# Patient Record
Sex: MALE | Race: WHITE | NOT HISPANIC OR LATINO | Employment: OTHER | URBAN - METROPOLITAN AREA
[De-identification: names, ages, dates, MRNs, and addresses within clinical notes are randomized per-mention and may not be internally consistent; named-entity substitution may affect disease eponyms.]

---
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
## 2022-01-24 ENCOUNTER — OFFICE VISIT (OUTPATIENT)
Dept: FAMILY MEDICINE CLINIC | Facility: CLINIC | Age: 22
End: 2022-01-24

## 2022-01-24 VITALS
TEMPERATURE: 98 F | BODY MASS INDEX: 25.11 KG/M2 | HEIGHT: 67 IN | DIASTOLIC BLOOD PRESSURE: 72 MMHG | HEART RATE: 100 BPM | WEIGHT: 160 LBS | SYSTOLIC BLOOD PRESSURE: 126 MMHG | OXYGEN SATURATION: 95 %

## 2022-01-24 DIAGNOSIS — Z00.00 HEALTHCARE MAINTENANCE: Primary | ICD-10-CM

## 2022-01-24 DIAGNOSIS — Z02.5 ROUTINE SPORTS PHYSICAL EXAM: ICD-10-CM

## 2022-01-24 PROCEDURE — 99395 PREV VISIT EST AGE 18-39: CPT | Performed by: NURSE PRACTITIONER

## 2022-01-24 PROCEDURE — 3008F BODY MASS INDEX DOCD: CPT | Performed by: NURSE PRACTITIONER

## 2022-01-24 PROCEDURE — 2014F MENTAL STATUS ASSESS: CPT | Performed by: NURSE PRACTITIONER

## 2022-01-24 RX ORDER — BACLOFEN 20 MG/1
20 TABLET ORAL 3 TIMES DAILY
COMMUNITY
Start: 2021-12-08

## 2022-01-24 NOTE — PROGRESS NOTES
Subjective   Justin Johnston is a 21 y.o. male who presents for a school sports physical exam. Patient/parent deny any current health related concerns.  He plans to participate in basketball and bowling.     Nutrition:He is Eating well-balanced meals and healthy snacks with no concerns. He Does not drink milk, but eats dairy foods well.    Home: No social issues were raised regarding home    School and Social Development: He attends [employer] in 12 grade and the patient Is doing well in school and Is currently working part-time while attending school; he goes to [employer] and works through school program.     He  does watch television. He does play video games. He does use a computer/cell phone/tablet at home.     Substance Use: The patient denies use of alcohol, tobacco, or illicit drugs.    Puberty/Sexuality: not applicable. is not sexually active.    Mental Health: Pt denies feeling down, currently going to therapy     Safety: The patient is restrained with a seatbelt while traveling in a motor vehicle at all times.  He does not ride a bicycle or four-shanks.     He does have a family history of hypertension, hyperlipidemia, or myocardial infarction before the age of 50.     Hedoes not have dental issues. He has established dental care.      Review of Systems  A comprehensive review of systems was negative except for: Review of Systems   Constitutional: Negative for chills and fever.   Respiratory: Negative for cough and shortness of breath.    Cardiovascular: Negative for chest pain and palpitations.   Gastrointestinal: Negative for abdominal pain, diarrhea, nausea and vomiting.   Psychiatric/Behavioral: The patient is not nervous/anxious.           Result Review :     The following portions of the patient's history were reviewed and updated as appropriate: allergies, current medications, past family history, past medical history, past social history, past surgical history, and  problem list.    Immunization History   Administered Date(s) Administered   • COVID-19 (PFIZER) PURPLE CAP 11/22/2021, 12/13/2021   • DTaP 2002, 2002, 02/25/2003, 12/13/2003, 08/14/2006   • Flu Vaccine Intradermal Quad 18-64YR 09/10/2018   • FluLaval/Fluarix/Fluzone >6 10/13/2021   • Hep A, 2 Dose 08/06/2018, 04/12/2019   • Hep B, Adolescent or Pediatric 2002, 2002, 02/25/2003   • Hepatitis A 04/12/2019   • Hepatitis B 02/25/2003   • HiB 08/28/2003   • Hib (PRP-T) 2002, 2002, 02/25/2003, 08/28/2003   • IPV 2002, 2002, 08/28/2003, 08/14/2006   • MMR 08/28/2003, 08/14/2006   • Meningococcal Conjugate 09/10/2018   • Meningococcal MCV4P (Menactra) 12/03/2013, 09/10/2018   • Pneumococcal Conjugate 13-Valent (PCV13) 2002, 02/25/2003, 04/21/2003, 12/13/2003   • Tdap 12/02/2013   • Varicella 08/28/2003, 08/02/2018                Objective      Physical Exam  Vitals reviewed.   Constitutional:       General: He is not in acute distress.     Appearance: Normal appearance. He is well-developed.   HENT:      Head: Normocephalic and atraumatic.      Right Ear: Tympanic membrane, ear canal and external ear normal.      Left Ear: Tympanic membrane, ear canal and external ear normal.      Nose: Nose normal.      Mouth/Throat:      Mouth: Mucous membranes are moist.   Eyes:      Conjunctiva/sclera: Conjunctivae normal.      Pupils: Pupils are equal, round, and reactive to light.   Cardiovascular:      Rate and Rhythm: Normal rate and regular rhythm.      Heart sounds: Normal heart sounds.   Pulmonary:      Effort: Pulmonary effort is normal.      Breath sounds: Normal breath sounds.   Abdominal:      General: Bowel sounds are normal.      Palpations: Abdomen is soft.   Musculoskeletal:      Cervical back: Neck supple.      Right lower leg: No edema.      Left lower leg: No edema.      Comments: Spasticity of right hand and wrist.  Some spasticity of bilateral ankles.  Patient  "with splint/brace on right wrist.  Lower extremity paresis.   Skin:     General: Skin is warm and dry.   Neurological:      Mental Status: He is alert and oriented to person, place, and time. Mental status is at baseline.      Motor: Weakness present.      Gait: Gait abnormal.   Psychiatric:         Mood and Affect: Mood and affect normal.         Behavior: Behavior normal.         Thought Content: Thought content normal.         Judgment: Judgment normal.         Vitals:    01/24/22 0837   BP: 126/72   BP Location: Left arm   Patient Position: Sitting   Cuff Size: Adult   Pulse: 100   Temp: 98 °F (36.7 °C)   TempSrc: Temporal   SpO2: 95%   Weight: 72.6 kg (160 lb)   Height: 170.2 cm (67\")        Assessment/Plan     Diagnoses and all orders for this visit:    1. Healthcare maintenance (Primary)    2. Routine sports physical exam        Satisfactory school sports physical exam.     Permission granted to participate in athletics without restrictions. Form signed and returned to patient.  Anticipatory guidance: Gave handout on well-child issues at this age.         "

## 2022-01-24 NOTE — PATIENT INSTRUCTIONS
"Well Child Nutrition, Young Adult  This sheet provides general nutrition recommendations. Talk with a health care provider or a diet and nutrition specialist (dietitian) if you have any questions.  Nutrition    The amount of food you need to eat every day depends on your age, sex, size, and activity level. To figure out your daily calorie needs, look for a calorie calculator online or talk with your health care provider.  Balanced diet  Eat a balanced diet. Try to include:  · Fruits. Aim for 2 cups a day. Examples of 1 cup of fruit include 1 large banana, 1 small apple, 8 large strawberries, or 1 large orange. Eat a variety of whole fruits and 100% fruit juice. Choose fresh, canned, frozen, or dried forms. Choose canned fruit that has the lowest added sugar or no added sugar.  · Vegetables. Aim for 2½-3 cups a day. Examples of 1 cup of vegetables include 2 medium carrots, 1 large tomato, or 2 stalks of celery. Choose fresh, frozen, canned, and dried options. Eat vegetables of a variety of colors.  · Low-fat dairy. Aim for 3 cups a day. Examples of 1 cup of dairy include 8 oz (230 mL) of milk, 8 oz (230 g) of yogurt, or 1½ oz (44 g) of natural cheese. Choose fat-free or low-fat dairy products, including milk, yogurt, and cheese. If you are unable to tolerate dairy (lactose intolerant) or you choose not to consume dairy, you may include fortified soy beverages (soy milk).  · Whole grains. Of the grain foods that you eat each day (such as pasta, rice, and tortillas), aim to include 6-8 \"ounce-equivalents\" of whole-grain options. Examples of 1 ounce-equivalent of whole grains include 1 cup of whole-wheat cereal, ½ cup of brown rice, or 1 slice of whole-wheat bread. Try to choose whole grains including brown rice, wild rice, quinoa, and oats.  · Lean proteins. Aim for 5½-6½ \"ounce-equivalents\" a day. Eat a variety of protein foods, including lean meats, seafood, poultry, eggs, legumes (beans and peas), nuts, seeds, and " soy products.  ? A cut of meat or fish that is the size of a deck of cards is about 3-4 ounce-equivalents.  ? Foods that provide 1 ounce-equivalent of protein include 1 egg, ½ cup of nuts or seeds, or 1 tablespoon (16 g) of peanut butter.  For more information and options for foods in a balanced diet, visit www.choosemyplate.gov  Tips for healthy snacking  · A snack should not be the size of a full meal. Eat snacks that have 200 calories or less. Examples include:  ? ½ whole-wheat marbin with ¼ cup hummus.  ? 2 or 3 slices of deli turkey wrapped around a cheese stick.  ? ½ apple with 1 tablespoon of peanut butter.  ? 10 baked chips with salsa.  · Keep cut-up fruits and vegetables available at home and at school so they are easy to eat.  · Pack healthy snacks the night before or when you pack your lunch.  · Avoid pre-packaged foods. These tend to be higher in fat, sugar, and salt (sodium).  · Get involved with shopping, or ask the primary food  in your household to get healthy snacks that you like.  · Avoid chips, candy, cake, and soft drinks.  Foods to avoid  · Fried or heavily processed foods, such as toaster pastries and microwaveable dinners.  · Drinks that contain a lot of sugar, such as sports drinks, sodas, and juice.  · Foods that contain a lot of fat, sodium, or sugar.  Food safety  Prepare your food safely:  · Wash your hands after handling raw meats.  · Keep food preparation surfaces clean by washing them regularly with hot, soapy water.  · Keep raw meats separate from foods that are ready-to-eat, such as fruits and vegetables.  · , meat, poultry, and eggs to the recommended minimum safe internal temperature.  · Store foods at safe temperatures. In general:  ? Keep cold foods at 40°F (4°C) or colder.  ? Keep your freezer at 0°F (-18°C or 18 degrees below 0°C) or colder.  ? Keep hot foods at 140°F (60°C) or warmer.  ? Foods are no longer safe to eat when they have been at a temperature of  °F (4-60°C) for more than 2 hours.  Physical activity  · Try to get 150 minutes of moderate-intensity physical activity each week. Examples include walking briskly or bicycling slower than 10 miles an hour (16 km an hour).  · Do muscle-strengthening exercises on 2 or more days a week.  · If you find it difficult to fit regular physical activity into your schedule, try:  ? Taking the stairs instead of the elevator.  ? Parking your car farther from the entrance or at the back of the parking lot.  ? Biking or walking to work or school.  · If you need to lose weight, you may need to reduce your daily calorie intake and increase your daily amount of physical activity. Check with your health care provider before you start a new diet and exercise plan.  General instructions  · Do not skip meals, especially breakfast.  · Water is the ideal beverage. Aim to drink six 8-oz glasses of water each day.  · Avoid fad diets. These may affect your mood and growth.  · If you choose to consume alcohol:  ? Drink in moderation. This means two drinks a day for men and one drink a day for nonpregnant women. One drink equals 12 oz of beer, 5 oz of wine, or 1½ oz of hard liquor.  · You may drink coffee. It is recommended that you limit coffee intake to three to five 8-oz cups a day (up to 400 mg of caffeine).  · If you are worried about your body image, talk with your parents, your health care provider, or another trusted adult like a  or counselor. You may be at risk for developing an eating disorder. Eating disorders can lead to serious medical problems.  · Food allergies may cause you to have a reaction (such as a rash, diarrhea, or vomiting) after eating or drinking. Talk with your health care provider if you have concerns about food allergies.  Summary  · Eat a balanced diet. Include fruits, vegetables, low-fat dairy, whole grains, and lean proteins.  · Try to get 150 minutes of moderate-intensity physical activity each  week, and do muscle-strengthening exercises on 2 or more days a week.  · Choose healthy snacks that are 200 calories or less.  · Drink plenty of water. Try to drink six 8-oz glasses a day.  This information is not intended to replace advice given to you by your health care provider. Make sure you discuss any questions you have with your health care provider.  Document Revised: 04/07/2020 Document Reviewed: 08/01/2018  Elsevier Patient Education © 2021 Elsevier Inc.

## 2022-02-21 ENCOUNTER — OFFICE VISIT (OUTPATIENT)
Dept: FAMILY MEDICINE CLINIC | Facility: CLINIC | Age: 22
End: 2022-02-21

## 2022-02-21 VITALS
WEIGHT: 160 LBS | TEMPERATURE: 98 F | BODY MASS INDEX: 25.11 KG/M2 | SYSTOLIC BLOOD PRESSURE: 120 MMHG | HEIGHT: 67 IN | OXYGEN SATURATION: 97 % | DIASTOLIC BLOOD PRESSURE: 82 MMHG | HEART RATE: 95 BPM

## 2022-02-21 DIAGNOSIS — R41.89 IMPAIRED COGNITION: ICD-10-CM

## 2022-02-21 DIAGNOSIS — Z02.9 ENCOUNTERS FOR ADMINISTRATIVE PURPOSE: ICD-10-CM

## 2022-02-21 DIAGNOSIS — Z87.820 HISTORY OF TRAUMATIC BRAIN INJURY: ICD-10-CM

## 2022-02-21 DIAGNOSIS — G40.211 LOCALIZATION-RELATED (FOCAL) (PARTIAL) SYMPTOMATIC EPILEPSY AND EPILEPTIC SYNDROMES WITH COMPLEX PARTIAL SEIZURES, INTRACTABLE, WITH STATUS EPILEPTICUS: Primary | ICD-10-CM

## 2022-02-21 PROCEDURE — 99213 OFFICE O/P EST LOW 20 MIN: CPT | Performed by: NURSE PRACTITIONER

## 2022-02-21 NOTE — PROGRESS NOTES
Chief Complaint  Seizures (annual Map 10 paperwork)    Subjective        Past Medical History:   Diagnosis Date   • Cerebral palsy (HCC)    • Constipation    • Epilepsy (HCC)    • Failure to thrive (child)    • Gastrostomy in place (HCC)    • Seizure (HCC)    • TBI (traumatic brain injury) (HCC)      Social History     Tobacco Use   • Smoking status: Never Smoker   • Smokeless tobacco: Never Used   Vaping Use   • Vaping Use: Never used   Substance Use Topics   • Alcohol use: Never   • Drug use: Never      Current Outpatient Medications on File Prior to Visit   Medication Sig   • baclofen (LIORESAL) 20 MG tablet Take 20 mg by mouth 3 (Three) Times a Day.   • cloBAZam (ONFI) 10 MG tablet    • glycopyrrolate (ROBINUL) 1 MG tablet Take 1 mg by mouth 2 (Two) Times a Day.   • levETIRAcetam (KEPPRA) 750 MG tablet Take 1,500 mg by mouth 2 (Two) Times a Day.   • OXcarbazepine (TRILEPTAL) 150 MG tablet Take 450 mg by mouth Every Morning.   • OXcarbazepine (TRILEPTAL) 600 MG tablet Take 600 mg by mouth Every Evening. Take with food   • sertraline (ZOLOFT) 25 MG tablet Take 1 tablet by mouth Daily.   • Vimpat 200 MG tablet      No current facility-administered medications on file prior to visit.      Allergies   Allergen Reactions   • Valproic Acid Other (See Comments)     Pancreatitis        Health Maintenance Due   Topic Date Due   • HPV VACCINES (1 - Male 2-dose series) Never done   • HEPATITIS C SCREENING  Never done      Justin Johnston presents to [facility]  Here with his mother to have [employer] Waiver completed. Pt has a hx of TBI at 4 years old and he has CP with epilepsy and impaired cognition. Pt is a Senior at [employer], [place] and is about to graduate but will go back for another year. Mother helps to transport him to appointments and assistance with medication management, cooking, money management, and household chores. Pt is unable to live without assistance. Mother also helps  "transport to his sporting events. He will stay with his grandparents on some weekends.     He is established with Neurology, Rehab medication, and Ortho. He has PT and OT. He had a G-tube but was removed about 2 months ago.       Objective   Vital Signs:   /82 (BP Location: Left arm)   Pulse 95   Temp 98 °F (36.7 °C)   Ht 170.2 cm (67\")   Wt 72.6 kg (160 lb)   SpO2 97%   BMI 25.06 kg/m²     Review of Systems   Cardiovascular: Negative for chest pain and palpitations.   Gastrointestinal: Negative for diarrhea, nausea and vomiting.   Neurological: Negative for dizziness, tremors, light-headedness and headache.      Physical Exam  Vitals reviewed.   Constitutional:       General: He is not in acute distress.     Appearance: Normal appearance. He is well-developed.   HENT:      Head: Normocephalic and atraumatic.      Right Ear: External ear normal.      Left Ear: External ear normal.   Eyes:      Conjunctiva/sclera: Conjunctivae normal.      Pupils: Pupils are equal, round, and reactive to light.   Cardiovascular:      Rate and Rhythm: Normal rate and regular rhythm.      Heart sounds: Normal heart sounds.   Pulmonary:      Effort: Pulmonary effort is normal.      Breath sounds: Normal breath sounds.   Musculoskeletal:      Cervical back: Neck supple.      Comments: Gait with a limp.  Contracture of the right hand and wrist.   Skin:     General: Skin is warm and dry.   Neurological:      Mental Status: He is alert and oriented to person, place, and time. Mental status is at baseline.   Psychiatric:         Mood and Affect: Mood and affect normal.         Behavior: Behavior normal.         Thought Content: Thought content normal.         Judgment: Judgment normal.        Result Review :                 Assessment and Plan    Diagnoses and all orders for this visit:    1. 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 History of traumatic brain injury    3. Impaired cognition    4. Encounters for administrative purpose        Follow Up   Return if symptoms worsen or fail to improve.  Patient was given instructions and counseling regarding his condition or for health maintenance advice. Please see specific information pulled into the AVS if appropriate.

## 2022-03-01 ENCOUNTER — TELEPHONE (OUTPATIENT)
Dept: FAMILY MEDICINE CLINIC | Facility: CLINIC | Age: 22
End: 2022-03-01

## 2022-03-01 NOTE — TELEPHONE ENCOUNTER
Caller: MEGAN MARCOS    Relationship to patient: Mother    Best call back number: CELL PHONE [phone] OR  LAND LINE [phone]    Chief complaint: PATIENT JUST NEEDS A COVID TEST PRIOR TO SURGERY ON 03/08/2022  FOR DIAGNOSIS GASTROCUTANEOUS FISTULA      Type of visit: COVID TEST ONLY     Requested date: TEST NEEDS DONE 96 HOURS PRIOR TO SURGERY DATE ON 03/08/2022 PAPERWORK STATES 4 DAYS PRIOR         Additional notes: RESULTS WILL NEED TO BE FAXED TO HIS SURGEON

## 2022-03-03 ENCOUNTER — TRANSCRIBE ORDERS (OUTPATIENT)
Dept: FAMILY MEDICINE CLINIC | Facility: CLINIC | Age: 22
End: 2022-03-03

## 2022-03-03 ENCOUNTER — CLINICAL SUPPORT (OUTPATIENT)
Dept: FAMILY MEDICINE CLINIC | Facility: CLINIC | Age: 22
End: 2022-03-03

## 2022-03-03 DIAGNOSIS — K31.6 GASTROCUTANEOUS FISTULA: Primary | ICD-10-CM

## 2022-03-03 LAB — SARS-COV-2 RNA PNL SPEC NAA+PROBE: NOT DETECTED

## 2022-03-03 PROCEDURE — U0004 COV-19 TEST NON-CDC HGH THRU: HCPCS | Performed by: SURGERY

## 2022-09-13 ENCOUNTER — OFFICE VISIT (OUTPATIENT)
Dept: FAMILY MEDICINE CLINIC | Facility: CLINIC | Age: 22
End: 2022-09-13

## 2022-09-13 VITALS
DIASTOLIC BLOOD PRESSURE: 72 MMHG | OXYGEN SATURATION: 98 % | TEMPERATURE: 98 F | HEART RATE: 103 BPM | HEIGHT: 67 IN | SYSTOLIC BLOOD PRESSURE: 110 MMHG | BODY MASS INDEX: 24.17 KG/M2 | WEIGHT: 154 LBS

## 2022-09-13 DIAGNOSIS — R63.4 WEIGHT LOSS: ICD-10-CM

## 2022-09-13 DIAGNOSIS — R31.9 HEMATURIA, UNSPECIFIED TYPE: ICD-10-CM

## 2022-09-13 DIAGNOSIS — B37.0 ORAL CANDIDIASIS: ICD-10-CM

## 2022-09-13 DIAGNOSIS — R42 DIZZINESS: Primary | ICD-10-CM

## 2022-09-13 LAB
BILIRUB BLD-MCNC: ABNORMAL MG/DL
CLARITY, POC: CLEAR
COLOR UR: YELLOW
GLUCOSE UR STRIP-MCNC: ABNORMAL MG/DL
KETONES UR QL: ABNORMAL
LEUKOCYTE EST, POC: NEGATIVE
NITRITE UR-MCNC: NEGATIVE MG/ML
PH UR: 7 [PH] (ref 5–8)
PROT UR STRIP-MCNC: ABNORMAL MG/DL
RBC # UR STRIP: ABNORMAL /UL
SP GR UR: 1.02 (ref 1–1.03)
UROBILINOGEN UR QL: ABNORMAL

## 2022-09-13 PROCEDURE — 99213 OFFICE O/P EST LOW 20 MIN: CPT | Performed by: NURSE PRACTITIONER

## 2022-09-13 PROCEDURE — 87086 URINE CULTURE/COLONY COUNT: CPT | Performed by: NURSE PRACTITIONER

## 2022-09-13 RX ORDER — POLYETHYLENE GLYCOL 3350 17 G/17G
17 POWDER, FOR SOLUTION ORAL
COMMUNITY

## 2022-09-13 NOTE — PROGRESS NOTES
Chief Complaint  Weight Loss (Since stopping the G tube) and Dizziness (Having severe dizziness from his medications)    Subjective        Past Medical History:   Diagnosis Date   • Cerebral palsy (HCC)    • Constipation    • Epilepsy (HCC)    • Failure to thrive (child)    • Gastrostomy in place (HCC)    • Seizure (HCC)    • TBI (traumatic brain injury) (HCC)      Social History     Tobacco Use   • Smoking status: Never Smoker   • Smokeless tobacco: Never Used   Vaping Use   • Vaping Use: Never used   Substance Use Topics   • Alcohol use: Never   • Drug use: Never      Current Outpatient Medications on File Prior to Visit   Medication Sig   • baclofen (LIORESAL) 20 MG tablet Take 20 mg by mouth 3 (Three) Times a Day.   • cloBAZam (ONFI) 10 MG tablet    • glycopyrrolate (ROBINUL) 1 MG tablet Take 1 mg by mouth 2 (Two) Times a Day.   • levETIRAcetam (KEPPRA) 750 MG tablet Take 1,500 mg by mouth 2 (Two) Times a Day.   • OXcarbazepine (TRILEPTAL) 150 MG tablet Take 450 mg by mouth Every Morning.   • OXcarbazepine (TRILEPTAL) 600 MG tablet Take 600 mg by mouth Every Evening. Take with food   • polyethylene glycol (MIRALAX) 17 GM/SCOOP powder Take 17 g by mouth.   • Vimpat 200 MG tablet    • sertraline (ZOLOFT) 25 MG tablet Take 1 tablet by mouth Daily.     No current facility-administered medications on file prior to visit.      Allergies   Allergen Reactions   • Valproic Acid Other (See Comments)     Pancreatitis        Health Maintenance Due   Topic Date Due   • HPV VACCINES (1 - Male 2-dose series) Never done   • HEPATITIS C SCREENING  Never done   • COVID-19 Vaccine (3 - Booster for Pfizer series) 05/13/2022      Justin Johnston presents to [facility]  Here with his mother for concern for mild weight loss. Pt had his G-tube taken out in June 2022. Pt states he doesn't eat very much at school because he doesn't like it. Pt will eat a pack of mini-muffins before school. Mother  "notes a decreased appetite. Pt is more active at school. He was previously eating up to 2 Big Mac's and a mcdaniels. Weight is down 6lbs from February.     Pt is seeing Neurology who is prescribing his medications and is transitioning to adult Neurology. Pt notes dizziness with some of his medications. Labs have been normal. Pt has not been having seizures so does not want to adjust medications. Pt notes his eyes will get glassy. Pt drinks little water.     Pt has a film on his tongue.  Patient's mother states that she brushes his teeth and tongue regularly and uses a tongue blade to scrape them as well without relief of symptoms.      Objective   Vital Signs:   /72 (BP Location: Left arm)   Pulse 103   Temp 98 °F (36.7 °C)   Ht 170.2 cm (67\")   Wt 69.9 kg (154 lb)   SpO2 98%   BMI 24.12 kg/m²     Review of Systems   Physical Exam  Vitals reviewed.   Constitutional:       General: He is not in acute distress.     Appearance: Normal appearance. He is well-developed.   HENT:      Mouth/Throat:      Comments: Thick white coating to tongue with a similar hairy appearance.  Eyes:      Conjunctiva/sclera: Conjunctivae normal.      Pupils: Pupils are equal, round, and reactive to light.   Cardiovascular:      Rate and Rhythm: Normal rate and regular rhythm.      Heart sounds: Normal heart sounds.   Pulmonary:      Effort: Pulmonary effort is normal.      Breath sounds: Normal breath sounds.   Musculoskeletal:      Cervical back: Neck supple.   Skin:     General: Skin is warm and dry.   Neurological:      Mental Status: He is alert and oriented to person, place, and time. Mental status is at baseline.   Psychiatric:         Mood and Affect: Mood and affect normal.         Behavior: Behavior normal.         Thought Content: Thought content normal.         Judgment: Judgment normal.        Result Review :                 Assessment and Plan    Diagnoses and all orders for this visit:    1. Dizziness (Primary)  -     " POCT urinalysis dipstick, manual    2. Weight loss    3. Oral candidiasis  -     nystatin (MYCOSTATIN) 100,000 unit/mL suspension; Swish and swallow 5 mL 4 (Four) Times a Day.  Dispense: 60 mL; Refill: 0      Increase food and water intake.   Encourage patient to increase in food intake and monitor school lunch menu and take lunch if knows there is no option that he likes.    Follow Up   Return if symptoms worsen or fail to improve.  Patient was given instructions and counseling regarding his condition or for health maintenance advice. Please see specific information pulled into the AVS if appropriate.

## 2022-09-14 LAB — BACTERIA SPEC AEROBE CULT: NO GROWTH

## 2023-02-26 DIAGNOSIS — F41.9 ANXIETY: ICD-10-CM

## 2023-02-27 RX ORDER — SERTRALINE HYDROCHLORIDE 25 MG/1
25 TABLET, FILM COATED ORAL DAILY
Qty: 90 TABLET | Refills: 1 | OUTPATIENT
Start: 2023-02-27

## 2023-03-02 ENCOUNTER — OFFICE VISIT (OUTPATIENT)
Dept: FAMILY MEDICINE CLINIC | Facility: CLINIC | Age: 23
End: 2023-03-02
Payer: MEDICAID

## 2023-03-02 VITALS
SYSTOLIC BLOOD PRESSURE: 120 MMHG | DIASTOLIC BLOOD PRESSURE: 82 MMHG | HEART RATE: 97 BPM | TEMPERATURE: 97.8 F | HEIGHT: 67 IN | WEIGHT: 140 LBS | BODY MASS INDEX: 21.97 KG/M2 | OXYGEN SATURATION: 96 %

## 2023-03-02 DIAGNOSIS — Z02.9 ENCOUNTERS FOR ADMINISTRATIVE PURPOSE: ICD-10-CM

## 2023-03-02 DIAGNOSIS — Z87.820 HISTORY OF TRAUMATIC BRAIN INJURY: ICD-10-CM

## 2023-03-02 DIAGNOSIS — G40.909 SEIZURE DISORDER: ICD-10-CM

## 2023-03-02 DIAGNOSIS — Z23 NEED FOR INFLUENZA VACCINATION: ICD-10-CM

## 2023-03-02 DIAGNOSIS — G80.2 SPASTIC HEMIPLEGIC CEREBRAL PALSY: Primary | ICD-10-CM

## 2023-03-02 DIAGNOSIS — R41.89 IMPAIRED COGNITION: ICD-10-CM

## 2023-03-02 PROCEDURE — 90686 IIV4 VACC NO PRSV 0.5 ML IM: CPT | Performed by: NURSE PRACTITIONER

## 2023-03-02 PROCEDURE — 90471 IMMUNIZATION ADMIN: CPT | Performed by: NURSE PRACTITIONER

## 2023-03-02 PROCEDURE — 99213 OFFICE O/P EST LOW 20 MIN: CPT | Performed by: NURSE PRACTITIONER

## 2023-03-02 NOTE — PROGRESS NOTES
Chief Complaint  Follow-up (Patient needing MAP 10 paperwork filled out)    PHQ-2 Total Score: 0    Subjective        Past Medical History:   Diagnosis Date   • Cerebral palsy (HCC)    • Constipation    • Epilepsy (HCC)    • Failure to thrive (child)    • Gastrostomy in place (HCC)    • Seizure (HCC)    • TBI (traumatic brain injury)      Social History     Tobacco Use   • Smoking status: Never   • Smokeless tobacco: Never   Vaping Use   • Vaping Use: Never used   Substance Use Topics   • Alcohol use: Never   • Drug use: Never      Current Outpatient Medications on File Prior to Visit   Medication Sig   • baclofen (LIORESAL) 20 MG tablet Take 1 tablet by mouth 3 (Three) Times a Day.   • cloBAZam (ONFI) 10 MG tablet    • glycopyrrolate (ROBINUL) 1 MG tablet Take 1 tablet by mouth 2 (Two) Times a Day.   • levETIRAcetam (KEPPRA) 750 MG tablet Take 2 tablets by mouth 2 (Two) Times a Day.   • nystatin (MYCOSTATIN) 100,000 unit/mL suspension Swish and swallow 5 mL 4 (Four) Times a Day.   • OXcarbazepine  MG tablet sustained-release 24 hour Take 1,200 mg by mouth Daily.   • polyethylene glycol (MIRALAX) 17 GM/SCOOP powder Take 17 g by mouth.   • Vimpat 200 MG tablet    • OXcarbazepine (TRILEPTAL) 150 MG tablet Take 3 tablets by mouth Every Morning.   • OXcarbazepine (TRILEPTAL) 600 MG tablet Take 1 tablet by mouth Every Evening. Take with food   • [DISCONTINUED] sertraline (ZOLOFT) 25 MG tablet Take 1 tablet by mouth Daily.     No current facility-administered medications on file prior to visit.      Allergies   Allergen Reactions   • Valproic Acid Other (See Comments)     Pancreatitis        Health Maintenance Due   Topic Date Due   • HPV VACCINES (1 - Male 2-dose series) Never done   • HEPATITIS C SCREENING  Never done   • COVID-19 Vaccine (3 - Booster for Pfizer series) 02/07/2022   • ANNUAL PHYSICAL  01/24/2023      Justin Johnston presents to [facility]  History of Present  "Illness  Here to follow up for paperwork for MAP-10 due to his brain injury. He is no longer in school. He is going to go to vocational rehab next week has an appointment. 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 He is helping load the  at home, doing some laundry, keep room clean or no computer games/video games. He is going with his mom during the day. He uses the microwave to reheat food and baked potatoes. He is able to make his own sandwiches.     He is transitioning his care to Adult providers.     Mother states that she will clean his ears regularly but seems to have excessive wax build-up.     Mother notes his recent weight loss, contributes that to whenever he was in school I would not eat frequently unknown due to distaste for the food served.      Objective   Vital Signs:   /82   Pulse 97   Temp 97.8 °F (36.6 °C)   Ht 170.2 cm (67\")   Wt 63.5 kg (140 lb)   SpO2 96%   BMI 21.93 kg/m²     Review of Systems   Cardiovascular: Negative for chest pain.   Gastrointestinal: Negative for diarrhea.   Neurological: Negative for headache.      Physical Exam  Vitals reviewed.   Constitutional:       General: He is not in acute distress.     Appearance: Normal appearance. He is well-developed.   HENT:      Head: Normocephalic and atraumatic.   Eyes:      Conjunctiva/sclera: Conjunctivae normal.      Pupils: Pupils are equal, round, and reactive to light.   Cardiovascular:      Rate and Rhythm: Normal rate and regular rhythm.      Heart sounds: Normal heart sounds.   Pulmonary:      Effort: Pulmonary effort is normal.      Breath sounds: Normal breath sounds.   Musculoskeletal:      Cervical back: Neck supple.      Comments: Wearing bilateral ankle orthotics.  Contractures of hand noted.   Skin:     General: Skin is warm and dry.   Neurological:      Mental Status: He is alert and oriented to person, place, and time. Mental status is at baseline.   Psychiatric:         " Mood and Affect: Mood and affect normal.         Behavior: Behavior normal.         Thought Content: Thought content normal.         Judgment: Judgment normal.        Result Review :                 Assessment and Plan    Diagnoses and all orders for this visit:    1. Spastic hemiplegic cerebral palsy (HCC) (Primary)    2. Encounters for administrative purpose    3. History of traumatic brain injury    4. Impaired cognition    5. Seizure disorder (HCC)    6. Need for influenza vaccination  -     FluLaval/Fluzone >6 mos (4960-4180)        Follow Up   Return if symptoms worsen or fail to improve.  Patient was given instructions and counseling regarding his condition or for health maintenance advice. Please see specific information pulled into the AVS if appropriate.

## 2023-12-07 ENCOUNTER — OFFICE VISIT (OUTPATIENT)
Dept: FAMILY MEDICINE CLINIC | Facility: CLINIC | Age: 23
End: 2023-12-07
Payer: MEDICAID

## 2023-12-07 VITALS
SYSTOLIC BLOOD PRESSURE: 110 MMHG | WEIGHT: 123.8 LBS | BODY MASS INDEX: 19.39 KG/M2 | OXYGEN SATURATION: 97 % | DIASTOLIC BLOOD PRESSURE: 70 MMHG | HEART RATE: 104 BPM | TEMPERATURE: 97.5 F

## 2023-12-07 DIAGNOSIS — Z23 NEED FOR INFLUENZA VACCINATION: Primary | ICD-10-CM

## 2023-12-07 DIAGNOSIS — G40.909 SEIZURE DISORDER: ICD-10-CM

## 2023-12-07 RX ORDER — LACOSAMIDE 200 MG/1
200 TABLET, FILM COATED ORAL EVERY 12 HOURS SCHEDULED
Qty: 60 TABLET | Status: CANCELLED | OUTPATIENT
Start: 2023-12-07

## 2023-12-07 RX ORDER — LACOSAMIDE 200 MG/1
200 TABLET, FILM COATED ORAL EVERY 12 HOURS SCHEDULED
Qty: 60 TABLET | Refills: 0 | Status: SHIPPED | OUTPATIENT
Start: 2023-12-07

## 2023-12-07 NOTE — PROGRESS NOTES
Chief Complaint  Seizures and Med Refill    Subjective          Justin Johnston presents to [facility]  History of Present Illness  Pt sees neurology February 5, 2024- needs vimpat refill until he sees neurology- will refill med until February 5, 2024- med controls seizures- pt takes med as supposed to- pt not showing signs of depression or addiction- pt has no SI or HI- pt tolerating med well.      BMI is within normal parameters. No other follow-up for BMI required.       Objective   Allergies   Allergen Reactions    Valproic Acid Other (See Comments)     Pancreatitis       Immunization History   Administered Date(s) Administered    COVID-19 (PFIZER) Purple Cap Monovalent 11/22/2021, 12/13/2021    DTaP 2002, 2002, 02/25/2003, 12/13/2003, 08/14/2006    Flu Vaccine Intradermal Quad 18-64YR 09/10/2018    Flu Vaccine Quad PF >36MO 12/17/2020    Fluzone (or Fluarix & Flulaval for VFC) >6mos 12/17/2020, 10/13/2021, 03/02/2023, 12/07/2023    Hep A, 2 Dose 08/06/2018, 04/12/2019    Hep B, Adolescent or Pediatric 2002, 2002, 02/25/2003    Hepatitis A 04/12/2019    Hepatitis B Adult/Adolescent IM 02/25/2003    HiB 08/28/2003    Hib (PRP-T) 2002, 2002, 02/25/2003, 08/28/2003    IPV 2002, 2002, 08/28/2003, 08/14/2006    MMR 08/28/2003, 08/14/2006    Meningococcal Conjugate 09/10/2018    Meningococcal MCV4P (Menactra) 12/03/2013, 09/10/2018    Pneumococcal Conjugate 13-Valent (PCV13) 2002, 02/25/2003, 04/21/2003, 12/13/2003    Tdap 12/02/2013    Varicella 08/28/2003, 08/02/2018     Past Medical History:   Diagnosis Date    Cerebral palsy     Constipation     Epilepsy     Failure to thrive (child)     Gastrostomy in place     Seizure     TBI (traumatic brain injury)       Past Surgical History:   Procedure Laterality Date    BARIATRIC SURGERY      BRAIN SURGERY      GTUBE INSERTION      HAMSTRING LENGTHENING      KNEE SURGERY Left      TENDON TRANSFER        Social History     Socioeconomic History    Marital status: Single   Tobacco Use    Smoking status: Never    Smokeless tobacco: Never   Vaping Use    Vaping Use: Never used   Substance and Sexual Activity    Alcohol use: Never    Drug use: Never        Current Outpatient Medications:     baclofen (LIORESAL) 20 MG tablet, Take 1 tablet by mouth 3 (Three) Times a Day., Disp: , Rfl:     cloBAZam (ONFI) 10 MG tablet, , Disp: , Rfl:     glycopyrrolate (ROBINUL) 1 MG tablet, Take 1 tablet by mouth 2 (Two) Times a Day., Disp: , Rfl:     levETIRAcetam (KEPPRA) 750 MG tablet, Take 2 tablets by mouth 2 (Two) Times a Day., Disp: , Rfl:     nystatin (MYCOSTATIN) 100,000 unit/mL suspension, Swish and swallow 5 mL 4 (Four) Times a Day., Disp: 60 mL, Rfl: 0    OXcarbazepine (TRILEPTAL) 150 MG tablet, Take 3 tablets by mouth Every Morning., Disp: , Rfl:     OXcarbazepine  MG tablet sustained-release 24 hour, Take 1,200 mg by mouth Daily., Disp: , Rfl:     polyethylene glycol (MIRALAX) 17 GM/SCOOP powder, Take 17 g by mouth., Disp: , Rfl:     Vimpat 200 MG tablet, Take 1 tablet by mouth Every 12 (Twelve) Hours., Disp: 60 tablet, Rfl: 0   Family History   Problem Relation Age of Onset    Asthma Mother     Depression Mother     Hypertension Maternal Grandmother           Vital Signs:   Vitals:    12/07/23 1549   BP: 110/70   BP Location: Left arm   Patient Position: Sitting   Cuff Size: Adult   Pulse: 104   Temp: 97.5 °F (36.4 °C)   SpO2: 97%   Weight: 56.2 kg (123 lb 12.8 oz)       Review of Systems   Physical Exam  Vitals reviewed.   Constitutional:       Appearance: Normal appearance. He is well-developed.   HENT:      Head: Normocephalic and atraumatic.      Right Ear: External ear normal.      Left Ear: External ear normal.      Mouth/Throat:      Pharynx: No oropharyngeal exudate.   Eyes:      Conjunctiva/sclera: Conjunctivae normal.      Pupils: Pupils are equal, round, and reactive to light.    Cardiovascular:      Rate and Rhythm: Normal rate and regular rhythm.      Pulses: Normal pulses.      Heart sounds: Normal heart sounds. No murmur heard.     No friction rub. No gallop.   Pulmonary:      Effort: Pulmonary effort is normal.      Breath sounds: Normal breath sounds. No wheezing or rhonchi.   Abdominal:      General: Abdomen is flat. Bowel sounds are normal. There is no distension.      Palpations: Abdomen is soft. There is no mass.      Tenderness: There is no abdominal tenderness. There is no guarding or rebound.      Hernia: No hernia is present.   Musculoskeletal:         General: Normal range of motion.   Skin:     General: Skin is warm and dry.      Capillary Refill: Capillary refill takes less than 2 seconds.   Neurological:      General: No focal deficit present.      Mental Status: He is alert and oriented to person, place, and time.      Cranial Nerves: No cranial nerve deficit.   Psychiatric:         Mood and Affect: Mood and affect normal.         Behavior: Behavior normal.         Thought Content: Thought content normal.         Judgment: Judgment normal.        Result Review :                 Assessment and Plan    Diagnoses and all orders for this visit:    1. Need for influenza vaccination (Primary)  -     Fluzone (or Fluarix & Flulaval for VFC) >6mos    2. Seizure disorder  -     Vimpat 200 MG tablet; Take 1 tablet by mouth Every 12 (Twelve) Hours.  Dispense: 60 tablet; Refill: 0            Follow Up   Return in about 1 month (around 1/7/2024) for Recheck.  Patient was given instructions and counseling regarding his condition or for health maintenance advice. Please see specific information pulled into the AVS if appropriate.

## 2024-03-26 ENCOUNTER — OFFICE VISIT (OUTPATIENT)
Dept: FAMILY MEDICINE CLINIC | Facility: CLINIC | Age: 24
End: 2024-03-26
Payer: MEDICAID

## 2024-03-26 VITALS
HEART RATE: 93 BPM | OXYGEN SATURATION: 97 % | SYSTOLIC BLOOD PRESSURE: 116 MMHG | BODY MASS INDEX: 18.49 KG/M2 | WEIGHT: 117.8 LBS | HEIGHT: 67 IN | TEMPERATURE: 97.1 F | DIASTOLIC BLOOD PRESSURE: 78 MMHG

## 2024-03-26 DIAGNOSIS — G80.2 SPASTIC HEMIPLEGIC CEREBRAL PALSY: ICD-10-CM

## 2024-03-26 DIAGNOSIS — Z87.820 HISTORY OF TRAUMATIC BRAIN INJURY: ICD-10-CM

## 2024-03-26 DIAGNOSIS — G40.909 SEIZURE DISORDER: ICD-10-CM

## 2024-03-26 DIAGNOSIS — R63.4 WEIGHT LOSS: ICD-10-CM

## 2024-03-26 DIAGNOSIS — Z02.9 ENCOUNTERS FOR ADMINISTRATIVE PURPOSE: Primary | ICD-10-CM

## 2024-03-26 DIAGNOSIS — R41.89 IMPAIRED COGNITION: ICD-10-CM

## 2024-03-26 PROBLEM — K11.7 EXCESSIVE SALIVATION: Status: ACTIVE | Noted: 2023-02-09

## 2024-03-26 PROBLEM — M62.838 OTHER MUSCLE SPASM: Status: ACTIVE | Noted: 2024-03-26

## 2024-03-26 PROBLEM — R53.81 PHYSICAL DECONDITIONING: Status: ACTIVE | Noted: 2021-03-29

## 2024-03-26 RX ORDER — MIDAZOLAM 5 MG/.1ML
SPRAY NASAL
COMMUNITY

## 2024-03-26 NOTE — PROGRESS NOTES
Chief Complaint  paperwork (Needs the map 10 paperwork done)    PHQ-2 Total Score: 0    Subjective        Past Medical History:   Diagnosis Date    Cerebral palsy     Constipation     Epilepsy     Failure to thrive (child)     Gastrostomy in place     Seizure     TBI (traumatic brain injury)      Social History     Tobacco Use    Smoking status: Never    Smokeless tobacco: Never   Vaping Use    Vaping status: Never Used   Substance Use Topics    Alcohol use: Never    Drug use: Never      Current Outpatient Medications on File Prior to Visit   Medication Sig    baclofen (LIORESAL) 20 MG tablet Take 1 tablet by mouth 3 (Three) Times a Day.    cloBAZam (ONFI) 10 MG tablet     glycopyrrolate (ROBINUL) 1 MG tablet Take 1 tablet by mouth 2 (Two) Times a Day.    levETIRAcetam (KEPPRA) 750 MG tablet Take 2 tablets by mouth 2 (Two) Times a Day.    Nayzilam 5 MG/0.1ML solution INSTILL ONE SPRAY INTO THE NOSE AS NEEDED FOR SEIZURES LASTING MORE THAN 3 MINUTES    OXcarbazepine  MG tablet sustained-release 24 hour Take 1,200 mg by mouth Daily.    polyethylene glycol (MIRALAX) 17 GM/SCOOP powder Take 17 g by mouth.    Vimpat 200 MG tablet Take 1 tablet by mouth Every 12 (Twelve) Hours.    nystatin (MYCOSTATIN) 100,000 unit/mL suspension Swish and swallow 5 mL 4 (Four) Times a Day.    OXcarbazepine (TRILEPTAL) 150 MG tablet Take 3 tablets by mouth Every Morning.     No current facility-administered medications on file prior to visit.      Allergies   Allergen Reactions    Valproic Acid Other (See Comments)     Pancreatitis        Health Maintenance Due   Topic Date Due    HPV VACCINES (1 - Male 3-dose series) Never done    HEPATITIS C SCREENING  Never done    ANNUAL PHYSICAL  01/24/2023    COVID-19 Vaccine (3 - 2023-24 season) 09/01/2023    TDAP/TD VACCINES (2 - Td or Tdap) 12/02/2023      Justin Johnston presents to [facility]  History of Present Illness  Here to have Map-10 paperwork  "completed. Pt is currently living with him mom and sister. He is currently working on being more hands-on with cooking/sandwichs; able to use microwave at 30sec intervals. Working on signing his signature. His mother is trying to get him to place his own order when out.     Pts mother notes some weight loss. Appetite is decreased; he will eat [employer] or [employer] but will not eat much of his mother's cooking. He is not drinking Protein shakes. He also drinks soda and tea. Drinks no milk. He will eat some cereal with milk. He doesn't eat snack cakes.     He is still seeing physical therapy, has an appointment tomorrow. When he goes downtown he will get some assistance with life-skills in kitchenette area. He had to do a lot of squats the other day.     He is doing special Olympics track.     Objective   Vital Signs:   /78 (BP Location: Left arm, Patient Position: Sitting)   Pulse 93   Temp 97.1 °F (36.2 °C) (Temporal)   Ht 170.2 cm (67\")   Wt 53.4 kg (117 lb 12.8 oz)   SpO2 97%   BMI 18.45 kg/m²     Review of Systems   Physical Exam  Vitals reviewed.   Constitutional:       General: He is not in acute distress.     Appearance: Normal appearance. He is well-developed.   HENT:      Head: Normocephalic and atraumatic.   Eyes:      Conjunctiva/sclera: Conjunctivae normal.      Pupils: Pupils are equal, round, and reactive to light.   Cardiovascular:      Rate and Rhythm: Normal rate and regular rhythm.      Heart sounds: Normal heart sounds.   Pulmonary:      Effort: Pulmonary effort is normal.      Breath sounds: Normal breath sounds.   Musculoskeletal:      Cervical back: Neck supple.      Right lower leg: No edema.      Left lower leg: No edema.      Comments: Contracture of the right hand, wrist, and arm. Wearing braces on bilateral ankles.    Skin:     General: Skin is warm and dry.   Neurological:      Mental Status: He is alert and oriented to person, place, and time.   Psychiatric:         Mood and " Affect: Mood and affect normal.         Behavior: Behavior normal.         Thought Content: Thought content normal.         Judgment: Judgment normal.        Result Review :                 Assessment and Plan    Diagnoses and all orders for this visit:    1. Encounters for administrative purpose (Primary)    2. Seizure disorder    3. Spastic hemiplegic cerebral palsy    4. History of traumatic brain injury    5. Impaired cognition    6. Weight loss          BMI is below normal parameters (malnutrition). Recommendations: Information on healthy weight added to patient's after visit summary     Discussion held with patient about increasing his dietary intake including healthy options and high-protein.    Follow Up   Return in about 1 year (around 3/26/2025) for Annual physical.  Patient was given instructions and counseling regarding his condition or for health maintenance advice. Please see specific information pulled into the AVS if appropriate.       Answers submitted by the patient for this visit:  Primary Reason for Visit (Submitted on 3/25/2024)  What is the primary reason for your visit?: Other  Other (Submitted on 3/25/2024)  Please describe your symptoms.: Routine appointment [facility] form  Have you had these symptoms before?: Yes  How long have you been having these symptoms?: Greater than 2 weeks

## 2024-08-30 ENCOUNTER — TELEPHONE (OUTPATIENT)
Dept: FAMILY MEDICINE CLINIC | Facility: CLINIC | Age: 24
End: 2024-08-30

## 2024-08-30 NOTE — TELEPHONE ENCOUNTER
Called and left message for pt's mom to call us back and let us know what kind of surgery he is having.

## 2024-08-30 NOTE — TELEPHONE ENCOUNTER
Caller: JESSICAELLENMEGAN    Relationship: Mother    Best call back number: [phone]     What form or medical record are you requesting: MEDICAL RELEASE FORM FOR SURGERY    Who is requesting this form or medical record from you: [facility]    How would you like to receive the form or medical records (pick-up, mail, fax):  AND FAX  If fax, what is the fax number: [phone] ATTENTION DR. CHRISTIANSON    Timeframe paperwork needed: TODAY 8.30.2024    Additional notes: PATIENT IS SCHEDULED FOR SURGERY ON 9.6.2024 AND MEDICAL RELEASE IS NEEDED TODAY. CALLER WILL  FORM AND PLEASE FAX COPY AS WELL.    CONTACT CALLER ASAP TO .        SHANIKA NO, CALL PREFERRED MAY LEAVE VOICEMAIL.

## 2025-03-18 ENCOUNTER — OFFICE VISIT (OUTPATIENT)
Dept: FAMILY MEDICINE CLINIC | Facility: CLINIC | Age: 25
End: 2025-03-18
Payer: MEDICAID

## 2025-03-18 VITALS
HEART RATE: 97 BPM | BODY MASS INDEX: 18.94 KG/M2 | TEMPERATURE: 97.5 F | SYSTOLIC BLOOD PRESSURE: 110 MMHG | OXYGEN SATURATION: 98 % | DIASTOLIC BLOOD PRESSURE: 70 MMHG | WEIGHT: 120.9 LBS

## 2025-03-18 DIAGNOSIS — Z00.00 ANNUAL PHYSICAL EXAM: Primary | ICD-10-CM

## 2025-03-18 DIAGNOSIS — Z11.59 NEED FOR HEPATITIS C SCREENING TEST: ICD-10-CM

## 2025-03-18 DIAGNOSIS — Z02.9 ENCOUNTERS FOR ADMINISTRATIVE PURPOSE: ICD-10-CM

## 2025-03-18 DIAGNOSIS — Z23 IMMUNIZATION DUE: ICD-10-CM

## 2025-03-18 DIAGNOSIS — E46 MALNUTRITION, UNSPECIFIED TYPE: ICD-10-CM

## 2025-03-18 PROBLEM — M25.562 LEFT KNEE PAIN: Status: ACTIVE | Noted: 2021-05-05

## 2025-03-18 PROBLEM — Z78.9 ALTERATION IN PERFORMANCE OF ACTIVITIES OF DAILY LIVING: Status: ACTIVE | Noted: 2025-03-18

## 2025-03-18 PROBLEM — K31.6 GASTROCUTANEOUS FISTULA: Status: ACTIVE | Noted: 2022-02-18

## 2025-03-18 PROBLEM — R41.82 ALTERED MENTAL STATUS: Status: ACTIVE | Noted: 2025-03-18

## 2025-03-18 PROBLEM — M54.50 ACUTE LEFT-SIDED LOW BACK PAIN WITHOUT SCIATICA: Status: ACTIVE | Noted: 2020-06-08

## 2025-03-18 LAB
25(OH)D3 SERPL-MCNC: 9.9 NG/ML (ref 30–100)
ALBUMIN SERPL-MCNC: 4.6 G/DL (ref 3.5–5.2)
ALBUMIN/GLOB SERPL: 1.7 G/DL
ALP SERPL-CCNC: 90 U/L (ref 39–117)
ALT SERPL W P-5'-P-CCNC: 10 U/L (ref 1–41)
ANION GAP SERPL CALCULATED.3IONS-SCNC: 7.6 MMOL/L (ref 5–15)
AST SERPL-CCNC: 14 U/L (ref 1–40)
BASOPHILS # BLD AUTO: 0.04 10*3/MM3 (ref 0–0.2)
BASOPHILS NFR BLD AUTO: 1.1 % (ref 0–1.5)
BILIRUB SERPL-MCNC: <0.2 MG/DL (ref 0–1.2)
BUN SERPL-MCNC: 10 MG/DL (ref 6–20)
BUN/CREAT SERPL: 10.3 (ref 7–25)
CALCIUM SPEC-SCNC: 10 MG/DL (ref 8.6–10.5)
CHLORIDE SERPL-SCNC: 104 MMOL/L (ref 98–107)
CO2 SERPL-SCNC: 31.4 MMOL/L (ref 22–29)
CREAT SERPL-MCNC: 0.97 MG/DL (ref 0.76–1.27)
DEPRECATED RDW RBC AUTO: 39 FL (ref 37–54)
EGFRCR SERPLBLD CKD-EPI 2021: 113.2 ML/MIN/1.73
EOSINOPHIL # BLD AUTO: 0.19 10*3/MM3 (ref 0–0.4)
EOSINOPHIL NFR BLD AUTO: 5.2 % (ref 0.3–6.2)
ERYTHROCYTE [DISTWIDTH] IN BLOOD BY AUTOMATED COUNT: 12.8 % (ref 12.3–15.4)
FERRITIN SERPL-MCNC: 81 NG/ML (ref 30–400)
FOLATE SERPL-MCNC: 9.01 NG/ML (ref 4.78–24.2)
GLOBULIN UR ELPH-MCNC: 2.7 GM/DL
GLUCOSE SERPL-MCNC: 79 MG/DL (ref 65–99)
HCT VFR BLD AUTO: 45.1 % (ref 37.5–51)
HCV AB SER QL: NORMAL
HGB BLD-MCNC: 15.5 G/DL (ref 13–17.7)
IMM GRANULOCYTES # BLD AUTO: 0.01 10*3/MM3 (ref 0–0.05)
IMM GRANULOCYTES NFR BLD AUTO: 0.3 % (ref 0–0.5)
IRON 24H UR-MRATE: 81 MCG/DL (ref 59–158)
IRON SATN MFR SERPL: 27 % (ref 20–50)
LYMPHOCYTES # BLD AUTO: 1.63 10*3/MM3 (ref 0.7–3.1)
LYMPHOCYTES NFR BLD AUTO: 44.5 % (ref 19.6–45.3)
MAGNESIUM SERPL-MCNC: 2.1 MG/DL (ref 1.6–2.6)
MCH RBC QN AUTO: 29.1 PG (ref 26.6–33)
MCHC RBC AUTO-ENTMCNC: 34.4 G/DL (ref 31.5–35.7)
MCV RBC AUTO: 84.8 FL (ref 79–97)
MONOCYTES # BLD AUTO: 0.35 10*3/MM3 (ref 0.1–0.9)
MONOCYTES NFR BLD AUTO: 9.6 % (ref 5–12)
NEUTROPHILS NFR BLD AUTO: 1.44 10*3/MM3 (ref 1.7–7)
NEUTROPHILS NFR BLD AUTO: 39.3 % (ref 42.7–76)
NRBC BLD AUTO-RTO: 0 /100 WBC (ref 0–0.2)
PLATELET # BLD AUTO: 237 10*3/MM3 (ref 140–450)
PMV BLD AUTO: 9.7 FL (ref 6–12)
POTASSIUM SERPL-SCNC: 4.1 MMOL/L (ref 3.5–5.2)
PROT SERPL-MCNC: 7.3 G/DL (ref 6–8.5)
RBC # BLD AUTO: 5.32 10*6/MM3 (ref 4.14–5.8)
SODIUM SERPL-SCNC: 143 MMOL/L (ref 136–145)
TIBC SERPL-MCNC: 295 MCG/DL (ref 298–536)
TRANSFERRIN SERPL-MCNC: 198 MG/DL (ref 200–360)
VIT B12 BLD-MCNC: 512 PG/ML (ref 211–946)
WBC NRBC COR # BLD AUTO: 3.66 10*3/MM3 (ref 3.4–10.8)

## 2025-03-18 PROCEDURE — 82607 VITAMIN B-12: CPT | Performed by: NURSE PRACTITIONER

## 2025-03-18 PROCEDURE — 86803 HEPATITIS C AB TEST: CPT | Performed by: NURSE PRACTITIONER

## 2025-03-18 PROCEDURE — 80053 COMPREHEN METABOLIC PANEL: CPT | Performed by: NURSE PRACTITIONER

## 2025-03-18 PROCEDURE — 84466 ASSAY OF TRANSFERRIN: CPT | Performed by: NURSE PRACTITIONER

## 2025-03-18 PROCEDURE — 83735 ASSAY OF MAGNESIUM: CPT | Performed by: NURSE PRACTITIONER

## 2025-03-18 PROCEDURE — 82306 VITAMIN D 25 HYDROXY: CPT | Performed by: NURSE PRACTITIONER

## 2025-03-18 PROCEDURE — 82728 ASSAY OF FERRITIN: CPT | Performed by: NURSE PRACTITIONER

## 2025-03-18 PROCEDURE — 85025 COMPLETE CBC W/AUTO DIFF WBC: CPT | Performed by: NURSE PRACTITIONER

## 2025-03-18 PROCEDURE — 83540 ASSAY OF IRON: CPT | Performed by: NURSE PRACTITIONER

## 2025-03-18 PROCEDURE — 82746 ASSAY OF FOLIC ACID SERUM: CPT | Performed by: NURSE PRACTITIONER

## 2025-03-18 NOTE — PROGRESS NOTES
Chief Complaint  MAP 10 FORM    Little interest or pleasure in doing things? Not at all   Feeling down, depressed, or hopeless? Not at all   PHQ-2 Total Score 0          Symptoms are: recurrent.   Onset was more than 5 years.   Symptoms occur: daily.  Symptoms include: joint pain, myalgias and weakness.   Pertinent negative symptoms include no abdominal pain, no anorexia, no change in stool, no chest pain, no chills, no congestion, no cough, no diaphoresis, no fatigue, no fever, no headaches, no joint swelling, no nausea, no neck pain, no numbness, no rash, no sore throat, no swollen glands, no dysuria, no vertigo, no visual change and no vomiting.     Justin Johnston is a 24 y.o. male who presents to [facility] with a past medical history of  Past Medical History:   Diagnosis Date    Cerebral palsy     Constipation     Epilepsy     Failure to thrive (child)     Gastrostomy in place     Seizure     TBI (traumatic brain injury)        HPI     The patient is a 24-year-old male who presents for weight management, cognitive delay, cerebral palsy, epilepsy, and health maintenance. He is accompanied by his mother.    His mother reports that his appetite has diminished since his graduation, coinciding with a change in his daily routine. Despite the removal of his G-tube due to satisfactory weight gain, he continues to struggle with early satiety. He has gained 3 pounds since March of last year. His diet is limited, with a preference for soda over water or milk, and his mother expresses concern about potential nutrient deficiencies. He has not undergone any recent laboratory tests.    He has a history of cerebral palsy and cognitive delay, which have been managed through therapy twice a week. His mother reports that he is capable of using the microwave and preparing simple meals such as cereal and sandwiches. However, he struggles with financial literacy, often equating $ 20 to $ 100.  He enjoys outdoor activities and socializing with friends. He has expressed interest in resuming sports and social events post-surgery.    He has a history of epilepsy.    He has not received a tetanus vaccine. He maintains good personal hygiene, although he produces excessive earwax.    He has undergone two wrist surgeries, one in September and another in December, which have resulted in a fused wrist with a finger plate. His mother suspects he may have brittle bones due to calcium deficiency. He has no upcoming surgeries planned. His mother plans to focus on shoulder rehabilitation upon their return to rehab medicine.    IMMUNIZATIONS  He is due for a tetanus vaccine.       Objective   Vital Signs:   Vitals:    03/18/25 1143   BP: 110/70   Pulse: 97   Temp: 97.5 °F (36.4 °C)   SpO2: 98%   Weight: 54.8 kg (120 lb 14.4 oz)   PainSc: 0-No pain     Body mass index is 18.94 kg/m².    Wt Readings from Last 3 Encounters:   03/18/25 54.8 kg (120 lb 14.4 oz)   03/26/24 53.4 kg (117 lb 12.8 oz)   12/07/23 56.2 kg (123 lb 12.8 oz)     BP Readings from Last 3 Encounters:   03/18/25 110/70   03/26/24 116/78   12/07/23 110/70       Health Maintenance   Topic Date Due    MENINGOCOCCAL B VACCINE (1 of 2 - Standard) Never done    HEPATITIS C SCREENING  Never done    ANNUAL PHYSICAL  01/24/2023    TDAP/TD VACCINES (2 - Td or Tdap) 12/02/2023    COVID-19 Vaccine (3 - 2024-25 season) 03/20/2025 (Originally 9/1/2024)    INFLUENZA VACCINE  03/31/2025 (Originally 7/1/2024)    Pneumococcal Vaccine 0-49  Completed    MENINGOCOCCAL VACCINE  Completed       Physical Exam  Vitals reviewed.   Constitutional:       General: He is not in acute distress.     Appearance: Normal appearance. He is well-developed.   HENT:      Head: Normocephalic and atraumatic.      Right Ear: Tympanic membrane, ear canal and external ear normal.      Left Ear: Tympanic membrane, ear canal and external ear normal.      Ears:      Comments: Non impacted excessive  ear wax  Eyes:      Conjunctiva/sclera: Conjunctivae normal.      Pupils: Pupils are equal, round, and reactive to light.   Cardiovascular:      Rate and Rhythm: Normal rate and regular rhythm.      Heart sounds: Normal heart sounds.   Pulmonary:      Effort: Pulmonary effort is normal.      Breath sounds: Normal breath sounds.   Musculoskeletal:      Cervical back: Neck supple.      Right lower leg: No edema.      Left lower leg: No edema.      Comments: Mild deformity of the right hand; wrist splint in place   Skin:     General: Skin is warm and dry.   Neurological:      Mental Status: He is alert and oriented to person, place, and time.   Psychiatric:         Mood and Affect: Mood and affect normal.         Behavior: Behavior normal.         Thought Content: Thought content normal.         Judgment: Judgment normal.            Result Review :  Results             Procedures        Assessment and Plan   Diagnoses and all orders for this visit:    1. Malnutrition, unspecified type (Primary)  -     CBC & Differential  -     Comprehensive Metabolic Panel  -     Vitamin D,25-Hydroxy  -     Vitamin B12 & Folate  -     Iron Profile  -     Ferritin  -     Magnesium    2. Need for hepatitis C screening test  -     Hepatitis C Antibody         1. Weight management.  His weight has increased by 3 pounds since March of last year. He is likely experiencing nutrient deficiencies due to his low weight and inadequate diet. Blood work will be ordered to assess B12, folate, vitamin D levels, iron, ferritin, CMP, sodium, and potassium. A hepatitis C screening will also be conducted.    2. Cognitive delay.  He continues to struggle with comprehending money management and other daily tasks. Continued efforts to teach him these skills are recommended.    3. Cerebral palsy.  He has had two surgeries on his wrist, including a fusion with a finger plate. He is currently undergoing therapy twice a week. Continued monitoring and  rehabilitation are recommended.    4. Epilepsy.  No recent seizures reported. Continue current management and monitoring.    5. Vitamin D deficiency.  Given his likely nutrient deficiencies. Blood work will be ordered to assess vitamin D levels.    6. Health maintenance.  He will receive a Tdap vaccine today. Meningococcal B vaccine is recommended but not immediately necessary as he is not in a high-risk group.    PROCEDURE  The patient underwent wrist fusion surgery with a finger plate in December.     BMI is within normal parameters. No other follow-up for BMI required.         FOLLOW UP  No follow-ups on file.    Patient was given instructions and counseling regarding his condition or for health maintenance advice. Please see specific information pulled into the AVS if appropriate.       LINK Leary  03/18/25  12:19 EDT    CURRENT & DISCONTINUED MEDICATIONS  Current Outpatient Medications   Medication Instructions    baclofen (LIORESAL) 20 mg, 3 Times Daily    cloBAZam (ONFI) 10 MG tablet No dose, route, or frequency recorded.    glycopyrrolate (ROBINUL) 1 mg, 2 Times Daily    levETIRAcetam (KEPPRA) 1,500 mg, 2 Times Daily    Nayzilam 5 MG/0.1ML solution INSTILL ONE SPRAY INTO THE NOSE AS NEEDED FOR SEIZURES LASTING MORE THAN 3 MINUTES    OXcarbazepine ER 1,200 mg, Daily    polyethylene glycol (MIRALAX) 17 g    Vimpat 200 mg, Oral, Every 12 Hours Scheduled       There are no discontinued medications.     Patient or patient representative verbalized consent for the use of Ambient Listening during the visit with  LINK Leary for chart documentation. 3/18/2025  12:09 EDT

## 2025-03-18 NOTE — PROGRESS NOTES
Venipuncture Blood Specimen Collection  Venipuncture performed in left arm by Marichuy Chambers MA with good hemostasis. Patient tolerated the procedure well without complications.   03/18/25   Chante Hardy MA